# Patient Record
Sex: MALE | Race: AMERICAN INDIAN OR ALASKA NATIVE
[De-identification: names, ages, dates, MRNs, and addresses within clinical notes are randomized per-mention and may not be internally consistent; named-entity substitution may affect disease eponyms.]

---

## 2018-08-01 ENCOUNTER — HOSPITAL ENCOUNTER (EMERGENCY)
Dept: HOSPITAL 5 - ED | Age: 33
Discharge: HOME | End: 2018-08-01
Payer: SELF-PAY

## 2018-08-01 VITALS — DIASTOLIC BLOOD PRESSURE: 92 MMHG | SYSTOLIC BLOOD PRESSURE: 146 MMHG

## 2018-08-01 DIAGNOSIS — Z87.891: ICD-10-CM

## 2018-08-01 DIAGNOSIS — E11.9: ICD-10-CM

## 2018-08-01 DIAGNOSIS — Z88.1: ICD-10-CM

## 2018-08-01 DIAGNOSIS — L02.01: Primary | ICD-10-CM

## 2018-08-01 PROCEDURE — 99282 EMERGENCY DEPT VISIT SF MDM: CPT

## 2018-08-01 NOTE — EMERGENCY DEPARTMENT REPORT
Abscess Boil HPI





- HPI


Chief Complaint: Skin/Abscess/Foreign Body


Stated Complaint: ABSCESS ON FACE


Time Seen by Provider: 08/01/18 12:47


Duration: 2 Days


Severity: Mild


History: Yes Pain, No Fever, No Purulent Drainage, No Numbness, No Foreign Body

, No Previous History, No Insect Bite


HPI: This is a 32-year-old male nontoxic well in appearance with no signs of 

distress resisted the ER with abscess to his left cheek area.  Patient stated 

that he had his area incised last year.  Patient he is up-to-date with tetanus 

as of 2017.  Patient denies any pus, drainage, fever, chills, nausea, vomiting, 

chest pain or short of breath.  Patient denies any headache or stiff neck.  

States allergies to clindamycin and vancomycin.  Denies significant past 

medical history.


Home Medications: 


 Previous Rx's











 Medication  Instructions  Recorded  Last Taken  Type


 


Ibuprofen [Motrin] 600 mg PO Q8H PRN #30 tablet 08/01/18 Unknown Rx


 


Sulfamethoxazole/Trimethoprim 1 each PO BID #14 tablet 08/01/18 Unknown Rx





[Bactrim DS TAB]    


 


traMADol [Ultram] 50 mg PO Q6HR PRN #12 tablet 08/01/18 Unknown Rx











Allergies/Adverse Reactions: 


 Allergies











Allergy/AdvReac Type Severity Reaction Status Date / Time


 


clindamycin Allergy  Hives Verified 08/01/18 12:05


 


vancomycin Allergy  Hives Verified 08/01/18 12:04














ED Review of Systems


ROS: 


Stated complaint: ABSCESS ON FACE


Other details as noted in HPI





Constitutional: denies: chills, fever


Eyes: denies: eye pain, eye discharge, vision change


ENT: denies: ear pain, throat pain


Respiratory: denies: cough, shortness of breath, wheezing


Cardiovascular: denies: chest pain, palpitations


Endocrine: no symptoms reported


Gastrointestinal: denies: abdominal pain, nausea, diarrhea


Genitourinary: denies: urgency, dysuria


Musculoskeletal: denies: back pain, joint swelling, arthralgia


Skin: denies: rash, lesions


Neurological: denies: headache, weakness, paresthesias


Psychiatric: denies: anxiety, depression


Hematological/Lymphatic: denies: easy bleeding, easy bruising





ED Past Medical Hx





- Past Medical History


Previous Medical History?: Yes


Hx Diabetes: Yes





- Surgical History


Past Surgical History?: No





- Social History


Smoking Status: Former Smoker


Substance Use Type: Alcohol





- Medications


Home Medications: 


 Home Medications











 Medication  Instructions  Recorded  Confirmed  Last Taken  Type


 


Ibuprofen [Motrin] 600 mg PO Q8H PRN #30 tablet 08/01/18  Unknown Rx


 


Sulfamethoxazole/Trimethoprim 1 each PO BID #14 tablet 08/01/18  Unknown Rx





[Bactrim DS TAB]     


 


traMADol [Ultram] 50 mg PO Q6HR PRN #12 tablet 08/01/18  Unknown Rx














ED Abscess Boil Physical Exam





- Exam


General: 


Vital signs noted. No distress. Alert and acting appropriately.





GENERAL: The patient is a well-developed, well-nourished in no apparent 

distress. Patient is alert and acting appropriately for age.  Alert and 

oriented 3, no apparent distress, normal gait, atraumatic.





HEENT: Head is normocephalic and atraumatic. PERRL, Extraocular muscles are 

intact. Pupils are equal, round, and reactive to light and accommodation. Nares 

appeared normal. Mouth is well hydrated and without lesions. Mucous membranes 

are moist. Posterior pharynx clear of any exudate or lesions.  Mouth is well 

hydrated and without lesions.  Tonsils not erythematous or swollen.  Uvula 

midline.  Tongue elevated.  Mucous members are moist.  Posterior pharynx clear, 

no exudate or lesions.  Patent airways.


 


NECK: Supple. No carotid bruits. No lymphadenopathy or thyromegaly.nontender. 

No meningitic signs are noted.


 


LUNGS: Clear to auscultation. Non labor breathing. No intercostal retractions.  

Symmetrical with respiration, no wheezing, no rales, or crackles.


 


HEART: Regular rate and rhythm without murmur, rubs or gallops. No 

reproducible.  S1, S2 present, regular rate and rhythm without murmur, no rubs, 

no gallops.


 


ABDOMEN: Soft, nontender, and nondistended.  Positive bowel sounds.  No 

hepatosplenomegaly was noted. No guarding or rebound tenderness, negative 

epigastric bruit. Negative psoas sign, negative villalobos sign, negative McBurneys 

sign


 


EXTREMITIES: Without any cyanosis, clubbing, rash, lesions or edema. Peripheral 

pulses intact. Capillary refill less than 2 seconds.  Full range of motion 

bilaterally.


 


NEUROLOGIC: Cranial nerves II through XII are grossly intact. Alert and 

oriented x 3. Normal gait. Symmetrical strength and sensation. Reflexes 2+ 

throughout. Cerebellar testing normal. GCS score of 15.


 


PSYCHIATRIC: Normal affect with no suicidal or homicidal ideations.





Front/Back of Body, Lg (Color): 


  __________________________














  __________________________





 1 - 2 cm abscess present





Size: 2 cm


Exam: Yes Tenderness, Yes Fluctuance, Yes Normal Neurologic Exam, Yes Normal 

Circulation, No Surrounding Cellulites/Erythema, No Lymphangitis, No Crepitation

, No Heart Murmur





I & D Note





- I & D Note


I & D Note: Under sterile field, I used Betadine to cleanse the area.  I then 

used 1% lidocaine plain with 25-gauge 5/8 needle to inject area for anesthetic 

purposes.  Total volume injected 3 mL.  I then used an 11 blade to make a 1 cm 

incision.  About 2 mL's of purulent drainage has been noted.  I then used a 

hemostat to break the abscess formation.  I then used sterile 0.9% normal 

saline flush to flush the wound with total volume of 40 mL used.  I then put a 1

/4 iodoform packing to the incision.  A sterile 4 x 4 with tape has been 

applied as dressing.  Bleeding is under control.  Patient tolerated the 

procedure well with no signs of distress noted.





ED Course


 Vital Signs











  08/01/18





  12:04


 


Temperature 98.3 F


 


Pulse Rate 100 H


 


Respiratory 18





Rate 


 


Blood Pressure 146/92


 


O2 Sat by Pulse 100





Oximetry 














- Reevaluation(s)


Reevaluation #1: 





08/01/18 12:58


Patient is speaking in full sentences with no signs of distress noted.


Critical care attestation.: 


If time is entered above; I have spent that time in minutes in the direct care 

of this critically ill patient, excluding procedure time.








ED Medical Decision Making





- Medical Decision Making





This is a 32-year-old male that presents with abscess.  Patient is stable and 

was examined by me.  This is incision and drainage and has been performed and 

patient tolerated well.  A sterile dressing has been applied.  Patient was 

educated on proper wound care.  Patient is discharged with Bactrim and Ultram 

and was instructed not to operate any machinery while taking Ultram due to 

drowsiness.  Patient was instructed to return in 2 days for packing removal.  

Patient was instructed to refer to Follow-up with a primary care doctor in 3-5 

days or if symptoms worsen and continue return to emergency room as soon as 

possible.  At time of discharge, the patient does not seem toxic or ill in 

appearance.  No acute signs of distress noted.  Patient agrees to discharge 

treatment plan of care.  No further questions noted by the patient. 





ED Disposition


Clinical Impression: 


 Abscess, Encounter for incision and drainage procedure





Disposition: DC-01 TO HOME OR SELFCARE


Is pt being admited?: No


Does the pt Need Aspirin: No


Condition: Stable


Instructions:  Abscess Incision and Drainage (ED), Abscess (ED), Tramadol (By 

mouth)


Additional Instructions: 


Follow-up with a primary care doctor in 3-5 days or if symptoms worsen and 

continue return to emergency room as soon as possible.  


Return in 2 days for packing removal and reassessment of the abscess.





Prescriptions: 


Ibuprofen [Motrin] 600 mg PO Q8H PRN #30 tablet


 PRN Reason: Pain


Sulfamethoxazole/Trimethoprim [Bactrim DS TAB] 1 each PO BID #14 tablet


traMADol [Ultram] 50 mg PO Q6HR PRN #12 tablet


 PRN Reason: Pain


Referrals: 


PRIMARY CARE,MD [Primary Care Provider] - 3-5 Days


MARTHA NAPIER MD [Staff Physician] - 3-5 Days


[facility] [Outside] - 3-5 Days


[facility] [Outside] - 3-5 Days


Forms:  Work/School Release Form(ED)